# Patient Record
Sex: FEMALE | URBAN - METROPOLITAN AREA
[De-identification: names, ages, dates, MRNs, and addresses within clinical notes are randomized per-mention and may not be internally consistent; named-entity substitution may affect disease eponyms.]

---

## 2020-02-07 ENCOUNTER — APPOINTMENT (RX ONLY)
Dept: URBAN - METROPOLITAN AREA CLINIC 141 | Facility: CLINIC | Age: 25
Setting detail: DERMATOLOGY
End: 2020-02-07

## 2020-02-07 DIAGNOSIS — Z41.9 ENCOUNTER FOR PROCEDURE FOR PURPOSES OTHER THAN REMEDYING HEALTH STATE, UNSPECIFIED: ICD-10-CM

## 2020-02-07 PROCEDURE — ? MICRODERMABRASION

## 2020-02-07 ASSESSMENT — LOCATION DETAILED DESCRIPTION DERM: LOCATION DETAILED: RIGHT INFERIOR MEDIAL FOREHEAD

## 2020-02-07 ASSESSMENT — LOCATION ZONE DERM: LOCATION ZONE: FACE

## 2020-02-07 ASSESSMENT — LOCATION SIMPLE DESCRIPTION DERM: LOCATION SIMPLE: RIGHT FOREHEAD

## 2020-02-07 NOTE — PROCEDURE: MICRODERMABRASION
Post-Care Instructions: I reviewed with the patient in detail post-care instructions. Patient should stay away from the sun and wear sun protection until treated areas are fully healed.
Vacuum Pressure: 4
Prep Text: The patient's skin was cleaned and prepped.
Indication: skin texture
Number Of Passes: 2
Treatment Number: 1
Consent: Written consent obtained, risks reviewed including but not limited to crusting, scabbing, blistering, scarring, darker or lighter pigmentary change, bruising, and/or incomplete response.
Price (Use Numbers Only, No Special Characters Or $): 25.00
Crystal Flow: medium
Detail Level: Zone